# Patient Record
Sex: FEMALE | Race: WHITE | Employment: FULL TIME | URBAN - METROPOLITAN AREA
[De-identification: names, ages, dates, MRNs, and addresses within clinical notes are randomized per-mention and may not be internally consistent; named-entity substitution may affect disease eponyms.]

---

## 2024-05-30 ENCOUNTER — HOSPITAL ENCOUNTER (EMERGENCY)
Age: 24
Discharge: HOME | End: 2024-05-30
Attending: EMERGENCY MEDICINE
Payer: COMMERCIAL

## 2024-05-30 VITALS
RESPIRATION RATE: 17 BRPM | TEMPERATURE: 97.2 F | HEIGHT: 68 IN | HEART RATE: 84 BPM | OXYGEN SATURATION: 97 % | BODY MASS INDEX: 21.22 KG/M2 | SYSTOLIC BLOOD PRESSURE: 114 MMHG | DIASTOLIC BLOOD PRESSURE: 75 MMHG | WEIGHT: 140 LBS

## 2024-05-30 DIAGNOSIS — R21 RASH AND OTHER NONSPECIFIC SKIN ERUPTION: Primary | ICD-10-CM

## 2024-05-30 DIAGNOSIS — L50.9 URTICARIA: ICD-10-CM

## 2024-05-30 PROCEDURE — 6360000002 HC RX W HCPCS: Performed by: PHYSICIAN ASSISTANT

## 2024-05-30 PROCEDURE — 99283 EMERGENCY DEPT VISIT LOW MDM: CPT

## 2024-05-30 RX ORDER — DEXAMETHASONE 4 MG/1
10 TABLET ORAL ONCE
Status: COMPLETED | OUTPATIENT
Start: 2024-05-30 | End: 2024-05-30

## 2024-05-30 RX ORDER — PREDNISONE 20 MG/1
60 TABLET ORAL DAILY
Qty: 15 TABLET | Refills: 0 | Status: SHIPPED | OUTPATIENT
Start: 2024-05-30 | End: 2024-05-30 | Stop reason: SINTOL

## 2024-05-30 RX ORDER — METHYLPREDNISOLONE 4 MG/1
TABLET ORAL
Qty: 21 TABLET | Refills: 0 | Status: SHIPPED | OUTPATIENT
Start: 2024-05-30

## 2024-05-30 RX ORDER — PREDNISONE 20 MG/1
40 TABLET ORAL ONCE
Status: DISCONTINUED | OUTPATIENT
Start: 2024-05-30 | End: 2024-05-30

## 2024-05-30 RX ADMIN — DEXAMETHASONE 10 MG: 4 TABLET ORAL at 10:55

## 2024-05-30 ASSESSMENT — PAIN - FUNCTIONAL ASSESSMENT: PAIN_FUNCTIONAL_ASSESSMENT: 0-10

## 2024-05-30 ASSESSMENT — PAIN SCALES - GENERAL: PAINLEVEL_OUTOF10: 4

## 2024-05-30 NOTE — ED PROVIDER NOTES
I independently examined and evaluated Katie Burns.    In brief, patient is a 24 yoF comes in with rash. Initially started on the forearm and now is everywhere including neck, abdomen, back, and both arms.  Ddx includes hives, erythema multiforme, viral exanthem, others. Appears like hives especially on her back. Says it initially responded to steroids. Will plan on treating with streroids. She had improvement after the decadron.  Discharged home with strict return precautions.    All diagnostic, treatment, and disposition decisions were made by myself in conjunction with the advanced practice provider/resident physician.     I personally saw the patient and performed a substantive portion of the visit including aspects of the medical decision making. I approved management plan and take responsibility for the patient management.     I personally saw the patient and independently provided 0 minutes of non-concurrent critical care out of the total shared critical care time provided.    Comment: Please note this report has been produced using speech recognition software and may contain errors related to that system including errors in grammar, punctuation, and spelling, as well as words and phrases that may be inappropriate. If there are any questions or concerns please feel free to contact the dictating provider for clarification.    For all further details of the patient's emergency department visit, please see the advanced practice provider's documentation.        Kerry Jauregui MD  05/31/24 5426    
female coming in with a pruritic rash that I do feel clinically resembles an allergic dermatitis most likely urticaria.  She has no wheezing swelling angioedema to suggest anaphylaxis.  Unsure of the etiology of this rash at the time but I do feel it is allergic.  I do not feel this is contagious.  I did write her a day off work to see if this will clear with increasing her steroids at home.  We discussed Benadryl and oral nonsedating antihistamine during the daytime.  Will switch her medications to Medrol Dosepak.  I feel this is less likely bacterial etiology, shingles, tinea.    Patient did improve after Decadron here in the ER.  She is not having any progressive symptoms.  The rash on her neck is cleared.  We discussed strict return precautions and taking the night off work tonight.  I also did give her referral to dermatology to be reevaluated.    The patient tolerated their visit well.  The patient and / or the family were informed of the results of any tests, a time was given to answer questions, a plan was proposed and they agreed with plan.    I am the Primary Clinician of Record.  FINAL IMPRESSION      1. Rash and other nonspecific skin eruption    2. Urticaria          DISPOSITION/PLAN     DISPOSITION Decision To Discharge 05/30/2024 11:45:12 AM      PATIENT REFERRED TO:  Argelia Millard MD  [address] [place] [address]  [phone]    Call   [facility]  [address]  [place] [address]  [phone]  Go to   If symptoms worsen      DISCHARGE MEDICATIONS:  New Prescriptions    METHYLPREDNISOLONE (MEDROL, NIKOLAY,) 4 MG TABLET    Take by mouth.       DISCONTINUED MEDICATIONS:  Discontinued Medications    No medications on file              (Please note that portions of this note were completed with a voice recognition program.  Efforts were made to edit the dictations but occasionally words are mis-transcribed.)    Layla Churchill PA-C (electronically